# Patient Record
Sex: FEMALE | Race: WHITE | NOT HISPANIC OR LATINO | URBAN - NONMETROPOLITAN AREA
[De-identification: names, ages, dates, MRNs, and addresses within clinical notes are randomized per-mention and may not be internally consistent; named-entity substitution may affect disease eponyms.]

---

## 2017-02-27 PROBLEM — H52.03: Noted: 2017-02-27

## 2017-02-27 PROBLEM — E11.9: Noted: 2017-02-27

## 2017-02-27 PROBLEM — H25.813: Noted: 2019-10-15

## 2017-02-27 PROBLEM — H25.13: Noted: 2017-02-27

## 2017-02-27 PROBLEM — H52.4: Noted: 2017-02-27

## 2017-02-27 PROBLEM — H52.222: Noted: 2017-02-27

## 2017-02-27 PROBLEM — H25.042: Noted: 2017-02-27

## 2019-07-31 ENCOUNTER — IMPORTED ENCOUNTER (OUTPATIENT)
Dept: URBAN - NONMETROPOLITAN AREA CLINIC 1 | Facility: CLINIC | Age: 59
End: 2019-07-31

## 2019-07-31 PROCEDURE — 92014 COMPRE OPH EXAM EST PT 1/>: CPT

## 2019-07-31 PROCEDURE — 92015 DETERMINE REFRACTIVE STATE: CPT

## 2019-07-31 NOTE — PATIENT DISCUSSION
Cataracts OU (R>L). Refer to Dr Ramya Mejia. NIDDM - no BDR.; Dr's Notes: [id number].  Naomi Ailyn.

## 2019-10-11 ENCOUNTER — IMPORTED ENCOUNTER (OUTPATIENT)
Dept: URBAN - NONMETROPOLITAN AREA CLINIC 1 | Facility: CLINIC | Age: 59
End: 2019-10-11

## 2019-10-11 PROCEDURE — 99213 OFFICE O/P EST LOW 20 MIN: CPT

## 2019-10-11 NOTE — PATIENT DISCUSSION
"Cataract(s)-Visually significant cataract OU .-Cataract(s) causing symptomatic impairment of visual function not correctable with a tolerable change in glasses or contact lenses lighting or non-operative means resulting in specific activity limitations and/or participation restrictions including but not limited to reading viewing television driving or meeting vocational or recreational needs. -Expectation is clearer vision and functional improvement in symptoms as well as reduced glare disability after cataract removal.-Order IOLMaster and OPD today. -Recommend standard/traditional based on today's OPD testing and lifestyle questionnaire.-All questions were answered regarding surgery including pre and post-op medications appointments activity restrictions and anesthetic usage.-The risks benefits and alternatives and special risk factors for the patient were discussed in detail including but not limited to: bleeding infection retinal detachment vitreous loss problems with the implant and possible need for additional surgery.-Although rare the possibility of complete vision loss was discussed.-The possible need for glasses post-operatively was discussed.-Order medical clearance exam based on history of -Patient elects to proceed with cataract surgery OD. Will schedule at patient's convenience and re-evaluate OS  in the future. Patient is interested in the new study for her right eye. ""DM w/ DR-Stressed the importance of keeping blood sugars under control blood pressure under control and weight normalization and regular visits with PCP. -Explained the possible effects of poorly controlled diabetes and the damage that diabetes can cause to ocular health. -Patient to check HgbA1C.-Pt instructed to contact our office with any vision changes.; Dr's Notes: [id number].  Olivia Benitez. "

## 2019-10-16 ENCOUNTER — IMPORTED ENCOUNTER (OUTPATIENT)
Dept: URBAN - NONMETROPOLITAN AREA CLINIC 1 | Facility: CLINIC | Age: 59
End: 2019-10-16

## 2019-10-16 PROBLEM — E11.9: Noted: 2019-10-16

## 2019-10-16 PROBLEM — H25.813: Noted: 2019-10-16

## 2019-10-16 NOTE — PATIENT DISCUSSION
Medical Clearance-Medical clearance done today. -No outstanding concerns that would preclude surgery.-Patient is cleared to proceed with scheduled surgery.; 's Notes: [id number].  Adriarai Howard.

## 2019-10-16 NOTE — PATIENT DISCUSSION
Medical Clearance-Medical clearance done today. -No outstanding concerns that would preclude surgery.-Patient is cleared to proceed with scheduled surgery.; Dr's Notes: [id number].  Jean Acevedo.

## 2019-10-21 PROBLEM — E11.9: Noted: 2019-10-21

## 2019-10-21 PROBLEM — H25.813: Noted: 2019-10-21

## 2019-10-21 PROBLEM — Z01.818: Noted: 2019-10-21

## 2019-10-21 PROBLEM — E78.5: Noted: 2019-10-21

## 2019-10-25 PROBLEM — Z01.818: Noted: 2019-10-25

## 2019-10-25 PROBLEM — H25.813: Noted: 2019-10-25

## 2019-10-25 PROBLEM — E11.9: Noted: 2019-10-25

## 2019-11-01 ENCOUNTER — IMPORTED ENCOUNTER (OUTPATIENT)
Dept: URBAN - NONMETROPOLITAN AREA CLINIC 1 | Facility: CLINIC | Age: 59
End: 2019-11-01

## 2019-11-01 PROBLEM — Z98.41: Noted: 2019-11-01

## 2019-11-01 PROBLEM — H25.812: Noted: 2019-11-01

## 2019-11-01 PROBLEM — E11.9: Noted: 2019-10-21

## 2019-11-01 PROBLEM — E78.5: Noted: 2019-10-21

## 2019-11-01 NOTE — PATIENT DISCUSSION
s/p PCIOL OD-Pt doing well s/p PCIOL OD.-Start post-op gtts according to instruction sheet and sleep with eye shield over eye for 7 nights.-Avoid bending at the waist lifting anything over 5lbs and dirty or raudel environments. -RTC as scheduled for 1 week POV/Re-eval.; 's Notes: [id number].  Matthew Nose.

## 2019-11-06 ENCOUNTER — IMPORTED ENCOUNTER (OUTPATIENT)
Dept: URBAN - NONMETROPOLITAN AREA CLINIC 1 | Facility: CLINIC | Age: 59
End: 2019-11-06

## 2019-11-06 PROCEDURE — 99213 OFFICE O/P EST LOW 20 MIN: CPT

## 2019-11-06 PROCEDURE — 99024 POSTOP FOLLOW-UP VISIT: CPT

## 2019-11-06 NOTE — PATIENT DISCUSSION
Cataract(s)-Visually significant cataract OS . -Cataract(s) causing symptomatic impairment of visual function not correctable with a tolerable change in glasses or contact lenses lighting or non-operative means resulting in specific activity limitations and/or participation restrictions including but not limited to reading viewing television driving or meeting vocational or recreational needs. -Expectation is clearer vision and functional improvement in symptoms as well as reduced glare disability after cataract removal.-Recommend Stand/trad based on previous OPD testing and lifestyle questionnaire.-All questions were answered regarding surgery including pre and post-op medications appointments activity restrictions and anesthetic usage.-The risks benefits and alternatives and special risk factors for the patient were discussed in detail including but not limited to: bleeding infection retinal detachment vitreous loss problems with the implant and possible need for additional surgery.-Although rare the possibility of complete vision loss was discussed.-The need for glasses post-operatively was discussed.-Patient elects to proceed with cataract surgery OS . Will schedule at patient's convenience. s/p PCIOL-Pt doing well at 1 week s/p PCIOL. -Continue post-op gtts according to instruction sheet.-Okay to resume usual activites and d/c eye shield.; 's Notes: [id number].  Huseyin Duke.

## 2019-11-13 PROBLEM — E11.9: Noted: 2019-11-13

## 2019-11-13 PROBLEM — H25.812: Noted: 2019-11-13

## 2019-11-13 PROBLEM — Z01.818: Noted: 2019-11-13

## 2019-11-14 ENCOUNTER — IMPORTED ENCOUNTER (OUTPATIENT)
Dept: URBAN - NONMETROPOLITAN AREA CLINIC 1 | Facility: CLINIC | Age: 59
End: 2019-11-14

## 2019-11-14 PROCEDURE — 99214 OFFICE O/P EST MOD 30 MIN: CPT

## 2019-11-14 NOTE — PATIENT DISCUSSION
Medical Clearance-Medical clearance done today. -No outstanding concerns that would preclude surgery.-Patient is cleared to proceed with scheduled surgery.; Dr's Notes: [id number].  Annmarielinda Lovelace.

## 2020-01-09 ENCOUNTER — IMPORTED ENCOUNTER (OUTPATIENT)
Dept: URBAN - NONMETROPOLITAN AREA CLINIC 1 | Facility: CLINIC | Age: 60
End: 2020-01-09

## 2020-01-09 PROBLEM — E11.9: Noted: 2020-01-09

## 2020-01-09 PROBLEM — Z98.42: Noted: 2020-01-09

## 2020-01-09 PROCEDURE — 99024 POSTOP FOLLOW-UP VISIT: CPT

## 2020-01-09 NOTE — PATIENT DISCUSSION
s/p PCIOL-Pt doing well at 1 week s/p PCIOL. -Continue post-op gtts according to instruction sheet.-Okay to resume usual activites and d/c eye shield.; Dr's Notes: [id number].  Cirilo Humphries.

## 2020-05-05 ENCOUNTER — IMPORTED ENCOUNTER (OUTPATIENT)
Dept: URBAN - NONMETROPOLITAN AREA CLINIC 1 | Facility: CLINIC | Age: 60
End: 2020-05-05

## 2020-05-05 PROBLEM — E11.9: Noted: 2020-01-09

## 2020-05-05 PROBLEM — Z96.1: Noted: 2020-05-05

## 2020-05-05 PROCEDURE — 99214 OFFICE O/P EST MOD 30 MIN: CPT

## 2020-05-05 NOTE — PATIENT DISCUSSION
marlenephakilinda brito DR-Stressed the importance of keeping blood sugars under control blood pressure under control and weight normalization and regular visits with PCP. -Explained the possible effects of poorly controlled diabetes and the damage that diabetes can cause to ocular health. -Patient to check HgbA1C.-Pt instructed to contact our office with any vision changes.; Dr's Notes: [id number].  Annmarie Lovelace.

## 2021-09-22 ENCOUNTER — IMPORTED ENCOUNTER (OUTPATIENT)
Dept: URBAN - NONMETROPOLITAN AREA CLINIC 1 | Facility: CLINIC | Age: 61
End: 2021-09-22

## 2021-09-22 PROCEDURE — 92014 COMPRE OPH EXAM EST PT 1/>: CPT

## 2021-09-22 PROCEDURE — 92015 DETERMINE REFRACTIVE STATE: CPT

## 2021-09-22 NOTE — PATIENT DISCUSSION
pseudophakilinda ornelasitor for pcoDM s -Stressed the importance of keeping blood sugars under control blood pressure under control and weight normalization and regular visits with PCP. -Explained the possible effects of poorly controlled diabetes and the damage that diabetes can cause to ocular health. -Patient to check HgbA1C.-Pt instructed to contact our office with any vision changes.; Dr's Notes: [id number].  Briana Torres.

## 2022-04-09 ASSESSMENT — VISUAL ACUITY
OS_SC: 20/25
OS_SC: 20/20
OS_CC: 20/20
OD_CC: 20/20
OD_CC: 20/20
OD_PAM: 20/30
OD_SC: 20/50
OS_AM: 20/20
OD_PH: 20/40
OD_CC: 20/20
OD_GLARE: 20/400
OD_PH: 20/30+
OS_PH: 20/20
OS_GLARE: 20/70
OD_CC: 20/40
OS_CC: 20/20
OS_CC: 20/20
OD_SC: 20/50
OS_AM: 20/20
OD_CC: 20/20-1
OS_GLARE: 20/70

## 2022-04-09 ASSESSMENT — TONOMETRY
OS_IOP_MMHG: 18
OD_IOP_MMHG: 17
OD_IOP_MMHG: 21
OD_IOP_MMHG: 18
OS_IOP_MMHG: 20
OD_IOP_MMHG: 18
OD_IOP_MMHG: 19
OS_IOP_MMHG: 22
OS_IOP_MMHG: 22
OD_IOP_MMHG: 20
OD_IOP_MMHG: 19
OS_IOP_MMHG: 19
OS_IOP_MMHG: 17

## 2023-09-25 ENCOUNTER — ESTABLISHED PATIENT (OUTPATIENT)
Dept: RURAL CLINIC 1 | Facility: CLINIC | Age: 63
End: 2023-09-25

## 2023-09-25 DIAGNOSIS — Z96.1: ICD-10-CM

## 2023-09-25 DIAGNOSIS — E11.9: ICD-10-CM

## 2023-09-25 PROCEDURE — 92014 COMPRE OPH EXAM EST PT 1/>: CPT

## 2023-09-25 ASSESSMENT — TONOMETRY
OD_IOP_MMHG: 18
OS_IOP_MMHG: 18

## 2023-09-25 ASSESSMENT — VISUAL ACUITY
OD_SC: 20/30
OS_SC: 20/20

## 2024-10-23 ENCOUNTER — COMPREHENSIVE EXAM (OUTPATIENT)
Dept: RURAL CLINIC 1 | Facility: CLINIC | Age: 64
End: 2024-10-23

## 2024-10-23 DIAGNOSIS — Z96.1: ICD-10-CM

## 2024-10-23 DIAGNOSIS — E11.9: ICD-10-CM

## 2024-10-23 PROCEDURE — 92014 COMPRE OPH EXAM EST PT 1/>: CPT
